# Patient Record
Sex: FEMALE | Race: WHITE
[De-identification: names, ages, dates, MRNs, and addresses within clinical notes are randomized per-mention and may not be internally consistent; named-entity substitution may affect disease eponyms.]

---

## 2020-02-08 ENCOUNTER — HOSPITAL ENCOUNTER (INPATIENT)
Dept: HOSPITAL 35 - 3W | Age: 65
LOS: 1 days | Discharge: HOME | DRG: 72 | End: 2020-02-09
Attending: HOSPITALIST | Admitting: HOSPITALIST
Payer: COMMERCIAL

## 2020-02-08 VITALS — SYSTOLIC BLOOD PRESSURE: 138 MMHG | DIASTOLIC BLOOD PRESSURE: 80 MMHG

## 2020-02-08 VITALS — BODY MASS INDEX: 24.73 KG/M2 | WEIGHT: 136.1 LBS | HEIGHT: 62.01 IN

## 2020-02-08 VITALS — SYSTOLIC BLOOD PRESSURE: 139 MMHG | DIASTOLIC BLOOD PRESSURE: 80 MMHG

## 2020-02-08 DIAGNOSIS — I16.0: ICD-10-CM

## 2020-02-08 DIAGNOSIS — Z82.3: ICD-10-CM

## 2020-02-08 DIAGNOSIS — K57.90: ICD-10-CM

## 2020-02-08 DIAGNOSIS — Z88.2: ICD-10-CM

## 2020-02-08 DIAGNOSIS — E78.5: ICD-10-CM

## 2020-02-08 DIAGNOSIS — Z79.899: ICD-10-CM

## 2020-02-08 DIAGNOSIS — G25.0: ICD-10-CM

## 2020-02-08 DIAGNOSIS — G45.4: Primary | ICD-10-CM

## 2020-02-08 PROCEDURE — 10779: CPT

## 2020-02-09 VITALS — DIASTOLIC BLOOD PRESSURE: 80 MMHG | SYSTOLIC BLOOD PRESSURE: 139 MMHG

## 2020-02-09 VITALS — SYSTOLIC BLOOD PRESSURE: 131 MMHG | DIASTOLIC BLOOD PRESSURE: 69 MMHG

## 2020-02-09 VITALS — SYSTOLIC BLOOD PRESSURE: 120 MMHG | DIASTOLIC BLOOD PRESSURE: 72 MMHG

## 2020-02-09 VITALS — DIASTOLIC BLOOD PRESSURE: 72 MMHG | SYSTOLIC BLOOD PRESSURE: 120 MMHG

## 2020-02-09 LAB
ANION GAP SERPL CALC-SCNC: 12 MMOL/L (ref 7–16)
BASOPHILS NFR BLD AUTO: 0 % (ref 0–2)
BUN SERPL-MCNC: 15 MG/DL (ref 7–18)
CALCIUM SERPL-MCNC: 8.9 MG/DL (ref 8.5–10.1)
CHLORIDE SERPL-SCNC: 103 MMOL/L (ref 98–107)
CHOLEST SERPL-MCNC: 181 MG/DL (ref ?–200)
CO2 SERPL-SCNC: 24 MMOL/L (ref 21–32)
CREAT SERPL-MCNC: 1 MG/DL (ref 0.6–1)
EOSINOPHIL NFR BLD: 2 % (ref 0–3)
ERYTHROCYTE [DISTWIDTH] IN BLOOD BY AUTOMATED COUNT: 13.3 % (ref 10.5–14.5)
GLUCOSE SERPL-MCNC: 99 MG/DL (ref 74–106)
GRANULOCYTES NFR BLD MANUAL: 64 % (ref 36–66)
HCT VFR BLD CALC: 45 % (ref 37–47)
HDLC SERPL-MCNC: 65 MG/DL (ref 40–?)
HGB BLD-MCNC: 14.7 GM/DL (ref 12–15)
LDLC SERPL-MCNC: 102 MG/DL (ref ?–100)
LYMPHOCYTES NFR BLD AUTO: 12 % (ref 24–44)
MAGNESIUM SERPL-MCNC: 2.2 MG/DL (ref 1.8–2.4)
MCH RBC QN AUTO: 31.3 PG (ref 26–34)
MCHC RBC AUTO-ENTMCNC: 32.7 G/DL (ref 28–37)
MCV RBC: 95.7 FL (ref 80–100)
MONOCYTES NFR BLD: 17 % (ref 1–8)
NEUTROPHILS # BLD: 7.5 THOU/UL (ref 1.4–8.2)
NEUTS BAND NFR BLD: 5 % (ref 0–8)
PLATELET # BLD EST: NORMAL 10*3/UL
PLATELET # BLD: 291 THOU/UL (ref 150–400)
POTASSIUM SERPL-SCNC: 3.6 MMOL/L (ref 3.5–5.1)
RBC # BLD AUTO: 4.7 MIL/UL (ref 4.2–5)
RBC MORPH BLD: NORMAL
SODIUM SERPL-SCNC: 139 MMOL/L (ref 136–145)
TC:HDL: 2.8 RATIO
TRIGL SERPL-MCNC: 73 MG/DL (ref ?–150)
VLDLC SERPL CALC-MCNC: 15 MG/DL (ref ?–40)
WBC # BLD AUTO: 10.9 THOU/UL (ref 4–11)

## 2020-02-09 NOTE — NUR
ASSUMED CARE OF PATIENT AT 1900. VSS, AFEBRILE. DOWN TO RADIOLOGY FOR MRI.
RESULTS NEGATIVE, JEREMY RIOS NOTIFIED. AT BEDSIDE, DISCUSSED RESULTS WITH
PATIENT. PATIENT SCORES 0 ON NIHSS, HOWEVER IS VERY FORGETFUL, ASKING THE SAME
QUESTIONS REPEATEDLY, SUCH AS "WHERE IS MY PURSE", "I DON'T REMEMBER COMING
HERE FROM [place]", "WHERE IS MY PHONE."  PURSE AND PHONE WITH PATIENT IN
DRAWER NEXT TO HER. UP TO THE BATHROOM, STEADY ON FEET. WORKING TOWARDS POC
GOALS.

## 2020-02-10 LAB
EST. AVERAGE GLUCOSE BLD GHB EST-MCNC: 114 MG/DL
GLYCOHEMOGLOBIN (HGB A1C): 5.6 % (ref 4.8–5.6)

## 2020-02-24 ENCOUNTER — HOSPITAL ENCOUNTER (OUTPATIENT)
Dept: HOSPITAL 35 - SJCVCIMAG | Age: 65
End: 2020-02-24
Attending: INTERNAL MEDICINE
Payer: COMMERCIAL

## 2020-02-24 DIAGNOSIS — E78.00: ICD-10-CM

## 2020-02-24 DIAGNOSIS — Z88.2: ICD-10-CM

## 2020-02-24 DIAGNOSIS — K21.9: ICD-10-CM

## 2020-02-24 DIAGNOSIS — I10: Primary | ICD-10-CM

## 2020-02-24 DIAGNOSIS — G45.4: ICD-10-CM

## 2020-02-24 DIAGNOSIS — M81.0: ICD-10-CM

## 2020-02-24 DIAGNOSIS — E78.5: ICD-10-CM

## 2021-12-16 ENCOUNTER — HOSPITAL ENCOUNTER (OUTPATIENT)
Dept: HOSPITAL 35 - SJCVC | Age: 66
End: 2021-12-16
Attending: INTERNAL MEDICINE
Payer: COMMERCIAL

## 2021-12-16 DIAGNOSIS — E78.00: ICD-10-CM

## 2021-12-16 DIAGNOSIS — E78.5: ICD-10-CM

## 2021-12-16 DIAGNOSIS — Z88.1: ICD-10-CM

## 2021-12-16 DIAGNOSIS — I30.0: Primary | ICD-10-CM

## 2021-12-16 DIAGNOSIS — G45.4: ICD-10-CM

## 2021-12-16 DIAGNOSIS — I10: ICD-10-CM

## 2021-12-16 DIAGNOSIS — Z79.899: ICD-10-CM

## 2021-12-16 DIAGNOSIS — Z82.49: ICD-10-CM

## 2021-12-16 DIAGNOSIS — K21.9: ICD-10-CM

## 2021-12-16 DIAGNOSIS — Z88.2: ICD-10-CM

## 2021-12-16 DIAGNOSIS — Z79.82: ICD-10-CM

## 2022-01-31 ENCOUNTER — HOSPITAL ENCOUNTER (OUTPATIENT)
Dept: HOSPITAL 35 - SJCVCIMAG | Age: 67
End: 2022-01-31
Attending: INTERNAL MEDICINE
Payer: COMMERCIAL

## 2022-01-31 DIAGNOSIS — I08.0: ICD-10-CM

## 2022-01-31 DIAGNOSIS — K21.9: ICD-10-CM

## 2022-01-31 DIAGNOSIS — Z79.899: ICD-10-CM

## 2022-01-31 DIAGNOSIS — Z88.1: ICD-10-CM

## 2022-01-31 DIAGNOSIS — I30.0: ICD-10-CM

## 2022-01-31 DIAGNOSIS — I49.8: ICD-10-CM

## 2022-01-31 DIAGNOSIS — E78.5: ICD-10-CM

## 2022-01-31 DIAGNOSIS — R94.31: Primary | ICD-10-CM

## 2022-01-31 DIAGNOSIS — G45.4: ICD-10-CM

## 2022-01-31 DIAGNOSIS — E78.00: ICD-10-CM

## 2022-01-31 DIAGNOSIS — I10: ICD-10-CM

## 2022-01-31 DIAGNOSIS — K57.30: ICD-10-CM

## 2022-01-31 DIAGNOSIS — M81.0: ICD-10-CM

## 2022-01-31 DIAGNOSIS — Z88.8: ICD-10-CM

## 2022-01-31 DIAGNOSIS — Z88.2: ICD-10-CM
